# Patient Record
Sex: MALE | Race: WHITE | NOT HISPANIC OR LATINO | Employment: FULL TIME | URBAN - NONMETROPOLITAN AREA
[De-identification: names, ages, dates, MRNs, and addresses within clinical notes are randomized per-mention and may not be internally consistent; named-entity substitution may affect disease eponyms.]

---

## 2023-04-01 PROBLEM — R73.9 HYPERGLYCEMIA: Status: ACTIVE | Noted: 2023-04-01

## 2023-04-01 PROBLEM — E78.6 HYPOALPHALIPOPROTEINEMIA: Status: ACTIVE | Noted: 2023-04-01

## 2023-04-01 PROBLEM — R74.8 ELEVATED LIPASE: Status: ACTIVE | Noted: 2023-04-01

## 2023-04-01 PROBLEM — F51.05 INSOMNIA SECONDARY TO DEPRESSION WITH ANXIETY: Status: ACTIVE | Noted: 2023-04-01

## 2023-04-01 PROBLEM — D75.1 POLYCYTHEMIA: Status: ACTIVE | Noted: 2023-04-01

## 2023-04-01 PROBLEM — R07.81 RIB PAIN ON LEFT SIDE: Status: ACTIVE | Noted: 2023-04-01

## 2023-04-01 PROBLEM — B07.0 PLANTAR WART OF RIGHT FOOT: Status: ACTIVE | Noted: 2023-04-01

## 2023-04-01 PROBLEM — R10.11 RIGHT UPPER QUADRANT ABDOMINAL PAIN: Status: ACTIVE | Noted: 2023-04-01

## 2023-04-01 PROBLEM — F17.200 SMOKER: Status: ACTIVE | Noted: 2023-04-01

## 2023-04-01 PROBLEM — D23.9: Status: ACTIVE | Noted: 2023-04-01

## 2023-04-01 PROBLEM — F41.8 INSOMNIA SECONDARY TO DEPRESSION WITH ANXIETY: Status: ACTIVE | Noted: 2023-04-01

## 2023-04-01 PROBLEM — L21.9 SEBORRHEIC DERMATITIS: Status: ACTIVE | Noted: 2023-04-01

## 2023-04-01 PROBLEM — R74.8 ELEVATED AMYLASE: Status: ACTIVE | Noted: 2023-04-01

## 2023-04-01 PROBLEM — D48.5 NEOPLASM OF UNCERTAIN BEHAVIOR OF SKIN: Status: ACTIVE | Noted: 2023-04-01

## 2023-04-01 PROBLEM — R10.9 ABDOMINAL PAIN: Status: ACTIVE | Noted: 2023-04-01

## 2023-04-01 PROBLEM — I10 HYPERTENSION: Status: ACTIVE | Noted: 2023-04-01

## 2023-04-01 PROBLEM — F43.23 SITUATIONAL MIXED ANXIETY AND DEPRESSIVE DISORDER: Status: ACTIVE | Noted: 2023-04-01

## 2023-04-01 PROBLEM — L82.1 SEBORRHEIC KERATOSIS: Status: ACTIVE | Noted: 2023-04-01

## 2023-04-01 PROBLEM — N52.9 ERECTILE DYSFUNCTION: Status: ACTIVE | Noted: 2023-04-01

## 2023-04-01 PROBLEM — R07.89 CHEST WALL PAIN: Status: ACTIVE | Noted: 2023-04-01

## 2023-04-01 PROBLEM — E78.1 HIGH TRIGLYCERIDES: Status: ACTIVE | Noted: 2023-04-01
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
## 2023-04-01 PROBLEM — R06.83 SNORING: Status: ACTIVE | Noted: 2023-04-01

## 2023-04-01 PROBLEM — S30.860A TICK BITE OF BACK: Status: ACTIVE | Noted: 2023-04-01

## 2023-04-01 RX ORDER — NAPROXEN 500 MG/1
500 TABLET ORAL
COMMUNITY
Start: 2022-05-02 | End: 2024-02-07 | Stop reason: ALTCHOICE

## 2023-04-01 RX ORDER — SILDENAFIL 50 MG/1
50 TABLET, FILM COATED ORAL AS NEEDED
COMMUNITY
End: 2024-02-07 | Stop reason: SDUPTHER

## 2023-04-01 RX ORDER — LISINOPRIL 10 MG/1
10 TABLET ORAL DAILY
COMMUNITY
Start: 2021-09-07 | End: 2024-02-26 | Stop reason: SDUPTHER

## 2023-04-04 ENCOUNTER — OFFICE VISIT (OUTPATIENT)
Dept: PRIMARY CARE | Facility: CLINIC | Age: 53
End: 2023-04-04
Payer: COMMERCIAL

## 2023-04-04 VITALS
BODY MASS INDEX: 29.43 KG/M2 | SYSTOLIC BLOOD PRESSURE: 128 MMHG | HEART RATE: 88 BPM | WEIGHT: 211 LBS | DIASTOLIC BLOOD PRESSURE: 78 MMHG

## 2023-04-04 DIAGNOSIS — R05.2 SUBACUTE COUGH: ICD-10-CM

## 2023-04-04 DIAGNOSIS — F41.9 ANXIETY: Primary | ICD-10-CM

## 2023-04-04 DIAGNOSIS — F17.200 SMOKER: ICD-10-CM

## 2023-04-04 PROCEDURE — 4004F PT TOBACCO SCREEN RCVD TLK: CPT | Performed by: INTERNAL MEDICINE

## 2023-04-04 PROCEDURE — 99214 OFFICE O/P EST MOD 30 MIN: CPT | Performed by: INTERNAL MEDICINE

## 2023-04-04 PROCEDURE — 3074F SYST BP LT 130 MM HG: CPT | Performed by: INTERNAL MEDICINE

## 2023-04-04 PROCEDURE — 3078F DIAST BP <80 MM HG: CPT | Performed by: INTERNAL MEDICINE

## 2023-04-04 RX ORDER — PAROXETINE HYDROCHLORIDE 20 MG/1
20 TABLET, FILM COATED ORAL EVERY MORNING
Qty: 30 TABLET | Refills: 1 | Status: SHIPPED | OUTPATIENT
Start: 2023-04-04 | End: 2023-05-01 | Stop reason: SDUPTHER

## 2023-04-04 RX ORDER — UMECLIDINIUM BROMIDE AND VILANTEROL TRIFENATATE 62.5; 25 UG/1; UG/1
1 POWDER RESPIRATORY (INHALATION) DAILY
Qty: 1 EACH | Refills: 1 | Status: SHIPPED | OUTPATIENT
Start: 2023-04-04 | End: 2024-02-14 | Stop reason: ALTCHOICE

## 2023-04-04 ASSESSMENT — ENCOUNTER SYMPTOMS
AGITATION: 1
LIGHT-HEADEDNESS: 0
ALLERGIC/IMMUNOLOGIC NEGATIVE: 1
WHEEZING: 0
HEADACHES: 0
CONFUSION: 0
FEVER: 0
ADENOPATHY: 0
DIARRHEA: 0
MUSCULOSKELETAL NEGATIVE: 1
HEMATOLOGIC/LYMPHATIC NEGATIVE: 1
CHILLS: 0
NAUSEA: 0
CONSTIPATION: 0
SHORTNESS OF BREATH: 0
ENDOCRINE NEGATIVE: 1
NUMBNESS: 0
EYE DISCHARGE: 0
COUGH: 1
NEUROLOGICAL NEGATIVE: 1
EYES NEGATIVE: 1
ABDOMINAL PAIN: 0
JOINT SWELLING: 0
BACK PAIN: 0
GASTROINTESTINAL NEGATIVE: 1
PALPITATIONS: 0
NECK STIFFNESS: 0
DYSURIA: 0
ABDOMINAL DISTENTION: 0
CONSTITUTIONAL NEGATIVE: 1
CARDIOVASCULAR NEGATIVE: 1
VOMITING: 0

## 2023-04-04 ASSESSMENT — PATIENT HEALTH QUESTIONNAIRE - PHQ9
5. POOR APPETITE OR OVEREATING: NOT AT ALL
SUM OF ALL RESPONSES TO PHQ9 QUESTIONS 1 AND 2: 6
10. IF YOU CHECKED OFF ANY PROBLEMS, HOW DIFFICULT HAVE THESE PROBLEMS MADE IT FOR YOU TO DO YOUR WORK, TAKE CARE OF THINGS AT HOME, OR GET ALONG WITH OTHER PEOPLE: SOMEWHAT DIFFICULT
7. TROUBLE CONCENTRATING ON THINGS, SUCH AS READING THE NEWSPAPER OR WATCHING TELEVISION: NOT AT ALL
9. THOUGHTS THAT YOU WOULD BE BETTER OFF DEAD, OR OF HURTING YOURSELF: NOT AT ALL
SUM OF ALL RESPONSES TO PHQ QUESTIONS 1-9: 14
8. MOVING OR SPEAKING SO SLOWLY THAT OTHER PEOPLE COULD HAVE NOTICED. OR THE OPPOSITE, BEING SO FIGETY OR RESTLESS THAT YOU HAVE BEEN MOVING AROUND A LOT MORE THAN USUAL: NOT AT ALL
3. TROUBLE FALLING OR STAYING ASLEEP OR SLEEPING TOO MUCH: NEARLY EVERY DAY
1. LITTLE INTEREST OR PLEASURE IN DOING THINGS: NEARLY EVERY DAY
4. FEELING TIRED OR HAVING LITTLE ENERGY: NEARLY EVERY DAY
6. FEELING BAD ABOUT YOURSELF - OR THAT YOU ARE A FAILURE OR HAVE LET YOURSELF OR YOUR FAMILY DOWN: MORE THAN HALF THE DAYS
2. FEELING DOWN, DEPRESSED OR HOPELESS: NEARLY EVERY DAY

## 2023-04-04 NOTE — PROGRESS NOTES
Subjective   Patient ID: Martin Hewitt is a 53 y.o. male who presents for Anxiety.  PT C/O  COUGH NASAL CONGESTION, STILL SMOKES MORE THAN 1/2 PPD  FOR 1.5 MO  SEVERITY: MODERATE  CHARACTERISTIC: SUBACUTE  EXACERBATION FACTOR: NONE  RELIEVING FACTOR: NONE  ASSOCIATED SYMPTOMS: NO SOB NO FEVER NO CHILLS, ANXIETY  PRIOR TX: NONE        Anxiety  Patient reports no chest pain, confusion, nausea, palpitations or shortness of breath.           Review of Systems   Constitutional: Negative.  Negative for chills and fever.   HENT: Negative.  Negative for congestion.    Eyes: Negative.  Negative for discharge.   Respiratory:  Positive for cough. Negative for shortness of breath and wheezing.    Cardiovascular: Negative.  Negative for chest pain, palpitations and leg swelling.   Gastrointestinal: Negative.  Negative for abdominal distention, abdominal pain, constipation, diarrhea, nausea and vomiting.   Endocrine: Negative.    Genitourinary: Negative.  Negative for dysuria and urgency.   Musculoskeletal: Negative.  Negative for back pain, joint swelling and neck stiffness.   Skin: Negative.  Negative for rash.   Allergic/Immunologic: Negative.  Negative for immunocompromised state.   Neurological: Negative.  Negative for light-headedness, numbness and headaches.   Hematological: Negative.  Negative for adenopathy.   Psychiatric/Behavioral:  Positive for agitation. Negative for behavioral problems and confusion.    All other systems reviewed and are negative.      Objective   Physical Exam  Vitals reviewed.   Constitutional:       General: He is not in acute distress.     Appearance: Normal appearance.   HENT:      Head: Normocephalic and atraumatic.      Nose: Nose normal.   Eyes:      Conjunctiva/sclera: Conjunctivae normal.      Pupils: Pupils are equal, round, and reactive to light.   Neck:      Vascular: No carotid bruit.   Cardiovascular:      Rate and Rhythm: Normal rate and regular rhythm.      Pulses: Normal pulses.       Heart sounds:      No gallop.   Pulmonary:      Effort: Pulmonary effort is normal. No respiratory distress.      Breath sounds: No wheezing.      Comments: DIMINISHED BS  Abdominal:      General: Bowel sounds are normal.      Palpations: Abdomen is soft.      Tenderness: There is no abdominal tenderness.   Musculoskeletal:         General: Normal range of motion.      Cervical back: Normal range of motion. No rigidity.   Lymphadenopathy:      Cervical: No cervical adenopathy.   Skin:     General: Skin is warm.      Findings: No rash.   Neurological:      General: No focal deficit present.      Mental Status: He is alert and oriented to person, place, and time.   Psychiatric:         Mood and Affect: Mood normal.         Behavior: Behavior normal.       /78 (BP Location: Right arm, Patient Position: Sitting)   Pulse 88   Wt 95.7 kg (211 lb)   BMI 29.43 kg/m²    No results found for: CBCDIF, CMPLAS, PSA, LASA, HGBA1C  Assessment/Plan   Problem List Items Addressed This Visit          Other    Smoker    Relevant Orders    XR chest 2 views    Pulmonary function testing    Anxiety - Primary    Relevant Medications    PARoxetine (Paxil) 20 mg tablet     Other Visit Diagnoses       Subacute cough        Relevant Medications    umeclidinium-vilanteroL (Anoro Ellipta) 62.5-25 mcg/actuation blister with device    Other Relevant Orders    XR chest 2 views    Pulmonary function testing         Labs reviewed with pt        MDM    1) COMPLEXITY: 1 UNDIAGNOSED NEW PROBLEM WITH UNCERTAIN PROGNOSIS  2)DATA: TESTS INTERPRETED AND OR ORDERED, TOOK INDEPENDENT HISTORY OR RECORDS REVIEWED  3)RISK: MODERATE RISK DUE TO NATURE OF MEDICAL CONDITIONS/COMORBIDITY OR MEDICATIONS ORDERED OR SURGICAL OR PROCEDURE REFERRAL, .    Addendum: abnormal CXR  r/o malignancy in the lung to get CT

## 2023-04-07 ENCOUNTER — TELEPHONE (OUTPATIENT)
Dept: PRIMARY CARE | Facility: CLINIC | Age: 53
End: 2023-04-07
Payer: COMMERCIAL

## 2023-04-07 NOTE — TELEPHONE ENCOUNTER
PT CALLED IN [facility] IS NOT COVERED BY INSURANCE AND WANTS IT CHANGED TO [facility].    ALSO HE SPOKE WITH HIS INSURANCE AND THE PFT WILL BE COVERED BUT INSURANCE REQUIRES A PA FOR THE XR AND WE DO NOT DO THEM. PLEASE ADVISE

## 2023-04-11 NOTE — TELEPHONE ENCOUNTER
I DID PRE AUTH LOOK UP IN BOTH [facility] AND CALLED AND SPOKE WITH BOTH PRE AUTH AND BENEFIT DEPARTMENTS NO PA IS NEEDED FOR A C XRAY AND IS COVERED BENEFIT.  PATIENT MADE AWARE AND IS GOOD.    THE INHALER SENT IN FOR HIM TO RA .00 HE CANNOT AFFORD THAT THE INSURANCE TOLD HIM THAT [facility] INHALER IS ONLY 115.00 CAN WE CALL THIS IN PLEASE.

## 2023-04-19 DIAGNOSIS — J18.9 PNEUMONIA DUE TO INFECTIOUS ORGANISM, UNSPECIFIED LATERALITY, UNSPECIFIED PART OF LUNG: Primary | ICD-10-CM

## 2023-04-19 RX ORDER — LEVOFLOXACIN 500 MG/1
500 TABLET, FILM COATED ORAL DAILY
Qty: 7 TABLET | Refills: 0 | Status: SHIPPED | OUTPATIENT
Start: 2023-04-19 | End: 2023-05-01 | Stop reason: ALTCHOICE

## 2023-05-01 ENCOUNTER — TELEPHONE (OUTPATIENT)
Dept: PRIMARY CARE | Facility: CLINIC | Age: 53
End: 2023-05-01

## 2023-05-01 ENCOUNTER — OFFICE VISIT (OUTPATIENT)
Dept: PRIMARY CARE | Facility: CLINIC | Age: 53
End: 2023-05-01
Payer: COMMERCIAL

## 2023-05-01 VITALS
SYSTOLIC BLOOD PRESSURE: 138 MMHG | HEART RATE: 64 BPM | BODY MASS INDEX: 29.4 KG/M2 | WEIGHT: 210 LBS | DIASTOLIC BLOOD PRESSURE: 78 MMHG | HEIGHT: 71 IN

## 2023-05-01 DIAGNOSIS — F17.200 SMOKER: ICD-10-CM

## 2023-05-01 DIAGNOSIS — R93.89 ABNORMAL CXR: Primary | ICD-10-CM

## 2023-05-01 DIAGNOSIS — F41.9 ANXIETY: ICD-10-CM

## 2023-05-01 PROCEDURE — 3078F DIAST BP <80 MM HG: CPT | Performed by: INTERNAL MEDICINE

## 2023-05-01 PROCEDURE — 99214 OFFICE O/P EST MOD 30 MIN: CPT | Performed by: INTERNAL MEDICINE

## 2023-05-01 PROCEDURE — 4004F PT TOBACCO SCREEN RCVD TLK: CPT | Performed by: INTERNAL MEDICINE

## 2023-05-01 PROCEDURE — 3075F SYST BP GE 130 - 139MM HG: CPT | Performed by: INTERNAL MEDICINE

## 2023-05-01 RX ORDER — LEVOFLOXACIN 500 MG/1
500 TABLET, FILM COATED ORAL DAILY
Qty: 7 TABLET | Refills: 0 | Status: SHIPPED | OUTPATIENT
Start: 2023-05-01 | End: 2023-05-08

## 2023-05-01 RX ORDER — PAROXETINE HYDROCHLORIDE 20 MG/1
20 TABLET, FILM COATED ORAL EVERY MORNING
Qty: 30 TABLET | Refills: 11 | Status: SHIPPED | OUTPATIENT
Start: 2023-05-01 | End: 2024-02-07 | Stop reason: SDUPTHER

## 2023-05-01 ASSESSMENT — ENCOUNTER SYMPTOMS
NUMBNESS: 0
SHORTNESS OF BREATH: 0
CONFUSION: 0
DYSURIA: 0
FEVER: 0
NAUSEA: 0
DIARRHEA: 0
PSYCHIATRIC NEGATIVE: 1
BACK PAIN: 0
WHEEZING: 0
ADENOPATHY: 0
HEMATOLOGIC/LYMPHATIC NEGATIVE: 1
VOMITING: 0
HEADACHES: 0
EYE DISCHARGE: 0
ENDOCRINE NEGATIVE: 1
LIGHT-HEADEDNESS: 0
CARDIOVASCULAR NEGATIVE: 1
MUSCULOSKELETAL NEGATIVE: 1
CHILLS: 0
AGITATION: 0
COUGH: 0
NECK STIFFNESS: 0
ABDOMINAL PAIN: 0
ALLERGIC/IMMUNOLOGIC NEGATIVE: 1
GASTROINTESTINAL NEGATIVE: 1
RESPIRATORY NEGATIVE: 1
CONSTITUTIONAL NEGATIVE: 1
NEUROLOGICAL NEGATIVE: 1
CONSTIPATION: 0
JOINT SWELLING: 0
EYES NEGATIVE: 1
PALPITATIONS: 0
ABDOMINAL DISTENTION: 0

## 2023-05-01 NOTE — PROGRESS NOTES
Subjective   Patient ID: Martin Hewitt is a 53 y.o. male who presents for Follow-up (3 WEEK F/U CXR + FENO AND MED CHECK ANXIETY. PATIENT WAS UNABLE TO COMPLETE PFT. PATIENT STATES [facility] COST WAS OVER $300 SO HE DIDN'T FILL THE PRESCRIPTION. ).  HERE FOR FU FEELS BETTER  NO MORE COUGH  SMOKE 1 PPD  COULD NOT DO PFT        Review of Systems   Constitutional: Negative.  Negative for chills and fever.   HENT: Negative.  Negative for congestion.    Eyes: Negative.  Negative for discharge.   Respiratory: Negative.  Negative for cough, shortness of breath and wheezing.    Cardiovascular: Negative.  Negative for chest pain, palpitations and leg swelling.   Gastrointestinal: Negative.  Negative for abdominal distention, abdominal pain, constipation, diarrhea, nausea and vomiting.   Endocrine: Negative.    Genitourinary: Negative.  Negative for dysuria and urgency.   Musculoskeletal: Negative.  Negative for back pain, joint swelling and neck stiffness.   Skin: Negative.  Negative for rash.   Allergic/Immunologic: Negative.  Negative for immunocompromised state.   Neurological: Negative.  Negative for light-headedness, numbness and headaches.   Hematological: Negative.  Negative for adenopathy.   Psychiatric/Behavioral: Negative.  Negative for agitation, behavioral problems and confusion.    All other systems reviewed and are negative.      Objective   Physical Exam  Vitals reviewed.   Constitutional:       General: He is not in acute distress.     Appearance: Normal appearance.   HENT:      Head: Normocephalic and atraumatic.      Nose: Nose normal.   Eyes:      Conjunctiva/sclera: Conjunctivae normal.      Pupils: Pupils are equal, round, and reactive to light.   Neck:      Vascular: No carotid bruit.   Cardiovascular:      Rate and Rhythm: Normal rate and regular rhythm.      Pulses: Normal pulses.      Heart sounds:      No gallop.   Pulmonary:      Effort: Pulmonary effort is normal. No respiratory distress.      Breath  "sounds: Normal breath sounds. No wheezing.   Abdominal:      General: Bowel sounds are normal.      Palpations: Abdomen is soft.      Tenderness: There is no abdominal tenderness.   Musculoskeletal:         General: Normal range of motion.      Cervical back: Normal range of motion. No rigidity.   Lymphadenopathy:      Cervical: No cervical adenopathy.   Skin:     General: Skin is warm.      Findings: No rash.   Neurological:      General: No focal deficit present.      Mental Status: He is alert and oriented to person, place, and time.   Psychiatric:         Mood and Affect: Mood normal.         Behavior: Behavior normal.       /78   Pulse 64   Ht 1.803 m (5' 11\")   Wt 95.3 kg (210 lb)   BMI 29.29 kg/m²    No results found for: CBCDIF, CMPLAS, PSA, LASA, HGBA1C  Assessment/Plan   Problem List Items Addressed This Visit          Other    Smoker    Anxiety    Relevant Medications    PARoxetine (Paxil) 20 mg tablet     Other Visit Diagnoses       Abnormal CXR    -  Primary    Relevant Medications    levoFLOXacin (Levaquin) 500 mg tablet    Other Relevant Orders    CT chest w IV contrast    Creatinine, Serum           \  FENO 6    CXR REVIEWED    I have counselled patient about need for smoking/tobacco cessation and how I can support efforts when patient is ready to quit.  Discussed nicotine replacement therapy, Varenicline, Bupropion, hypnosis, support groups, and acupuncture as potential options.  I spend 4 minutes counseling.        MDM    1) COMPLEXITY: 1 OR MORE CHRONIC CONDITION WITH EXACERBATION, OR PROGRESSION OR SIDE EFFECT OF TREATMENT ADDRESSED  2)DATA: TESTS INTERPRETED AND OR ORDERED, TOOK INDEPENDENT HISTORY OR RECORDS REVIEWED  3)RISK: MODERATE RISK DUE TO NATURE OF MEDICAL CONDITIONS/COMORBIDITY OR MEDICATIONS ORDERED OR SURGICAL OR PROCEDURE REFERRAL, .      TO TAKE A COURSE OF ABX THEN YO HACVE CT AFTER 2 WEEKS THEN REEVAL    FU 4 WEEKS  "

## 2023-05-04 DIAGNOSIS — R93.89 ABNORMAL CXR: Primary | ICD-10-CM

## 2023-05-04 NOTE — TELEPHONE ENCOUNTER
PATIENT WANTS TO FIND OUT HOW MUCH THIS WILL BE OUT OF POCKET PRIOR TO GOING WITH REFERRAL; I HAVE A MESSAGE INTO [phone] THEY ARE TO CALL ME WITH INFORMATION.  IF UNDER 800.00 WILL MOVE FORWARD IF OVER GO WITH PULMONOLGIST.  CT IS SCHEDULE PER [place] FOR 5/23/23 @ 2:30PM

## 2023-05-05 NOTE — TELEPHONE ENCOUNTER
SPOKE WITH DEPARTMENT AND RECEIVED ANOTHER NUMBER [phone] LEFT MESSAGE WITH THEM TO RETURN CALL FOR PRICING

## 2023-05-09 NOTE — TELEPHONE ENCOUNTER
QUOTE FOR CT IS $5096 AND PATIENT IS GOING TO GO WITH PULMONOLOGIST REFERRAL  SCHEDULED WITH PATIENT AND [facility] FOR MAY 17TH @ 3:20PM

## 2023-06-05 ENCOUNTER — APPOINTMENT (OUTPATIENT)
Dept: PRIMARY CARE | Facility: CLINIC | Age: 53
End: 2023-06-05
Payer: COMMERCIAL

## 2023-10-23 ENCOUNTER — APPOINTMENT (OUTPATIENT)
Dept: URBAN - METROPOLITAN AREA CLINIC 204 | Age: 53
Setting detail: DERMATOLOGY
End: 2023-10-24

## 2023-10-23 PROCEDURE — 99213 OFFICE O/P EST LOW 20 MIN: CPT

## 2023-10-23 PROCEDURE — OTHER MIPS QUALITY: OTHER

## 2023-10-23 NOTE — PROCEDURE: MIPS QUALITY
Detail Level: Detailed
Additional Notes: Documentation for MIPS  purposes only. Full Patient visit note from paper chart is available for review and also scanned in EMA chart.
Quality 226: Preventive Care And Screening: Tobacco Use: Screening And Cessation Intervention: Patient screened for tobacco use, is a smoker AND received Cessation Counseling within the Previous 12 Months
Additional Notes: Patient received verbal cessation counseling (3 min or less)
Patient tolerated procedure well.

## 2024-01-31 ENCOUNTER — LAB (OUTPATIENT)
Dept: LAB | Facility: LAB | Age: 54
End: 2024-01-31
Payer: COMMERCIAL

## 2024-01-31 DIAGNOSIS — D75.1 SECONDARY POLYCYTHEMIA: ICD-10-CM

## 2024-01-31 DIAGNOSIS — R93.89 ABNORMAL CXR: ICD-10-CM

## 2024-01-31 DIAGNOSIS — R73.9 HYPERGLYCEMIA, UNSPECIFIED: Primary | ICD-10-CM

## 2024-01-31 DIAGNOSIS — I10 ESSENTIAL (PRIMARY) HYPERTENSION: ICD-10-CM

## 2024-01-31 DIAGNOSIS — Z12.5 ENCOUNTER FOR SCREENING FOR MALIGNANT NEOPLASM OF PROSTATE: ICD-10-CM

## 2024-01-31 DIAGNOSIS — E78.1 PURE HYPERGLYCERIDEMIA: ICD-10-CM

## 2024-01-31 LAB
ALBUMIN SERPL BCP-MCNC: 4.6 G/DL (ref 3.4–5)
ALP SERPL-CCNC: 93 U/L (ref 33–120)
ALT SERPL W P-5'-P-CCNC: 30 U/L (ref 10–52)
ANION GAP SERPL CALC-SCNC: 10 MMOL/L (ref 10–20)
AST SERPL W P-5'-P-CCNC: 17 U/L (ref 9–39)
BASOPHILS # BLD AUTO: 0.09 X10*3/UL (ref 0–0.1)
BASOPHILS NFR BLD AUTO: 1.1 %
BILIRUB SERPL-MCNC: 0.6 MG/DL (ref 0–1.2)
BUN SERPL-MCNC: 10 MG/DL (ref 6–23)
CALCIUM SERPL-MCNC: 9.3 MG/DL (ref 8.6–10.3)
CHLORIDE SERPL-SCNC: 108 MMOL/L (ref 98–107)
CHOLEST SERPL-MCNC: 169 MG/DL (ref 0–199)
CHOLESTEROL/HDL RATIO: 7
CO2 SERPL-SCNC: 27 MMOL/L (ref 21–32)
CREAT SERPL-MCNC: 1.05 MG/DL (ref 0.5–1.3)
EGFRCR SERPLBLD CKD-EPI 2021: 85 ML/MIN/1.73M*2
EOSINOPHIL # BLD AUTO: 0.29 X10*3/UL (ref 0–0.7)
EOSINOPHIL NFR BLD AUTO: 3.5 %
ERYTHROCYTE [DISTWIDTH] IN BLOOD BY AUTOMATED COUNT: 13.4 % (ref 11.5–14.5)
GLUCOSE SERPL-MCNC: 99 MG/DL (ref 74–99)
HCT VFR BLD AUTO: 55.7 % (ref 41–52)
HDLC SERPL-MCNC: 24 MG/DL
HGB BLD-MCNC: 18.7 G/DL (ref 13.5–17.5)
IMM GRANULOCYTES # BLD AUTO: 0.03 X10*3/UL (ref 0–0.7)
IMM GRANULOCYTES NFR BLD AUTO: 0.4 % (ref 0–0.9)
LDLC SERPL CALC-MCNC: 109 MG/DL
LYMPHOCYTES # BLD AUTO: 2.37 X10*3/UL (ref 1.2–4.8)
LYMPHOCYTES NFR BLD AUTO: 29 %
MCH RBC QN AUTO: 31.7 PG (ref 26–34)
MCHC RBC AUTO-ENTMCNC: 33.6 G/DL (ref 32–36)
MCV RBC AUTO: 94 FL (ref 80–100)
MONOCYTES # BLD AUTO: 0.83 X10*3/UL (ref 0.1–1)
MONOCYTES NFR BLD AUTO: 10.2 %
NEUTROPHILS # BLD AUTO: 4.56 X10*3/UL (ref 1.2–7.7)
NEUTROPHILS NFR BLD AUTO: 55.8 %
NON HDL CHOLESTEROL: 145 MG/DL (ref 0–149)
NRBC BLD-RTO: 0 /100 WBCS (ref 0–0)
PLATELET # BLD AUTO: 295 X10*3/UL (ref 150–450)
POTASSIUM SERPL-SCNC: 4.4 MMOL/L (ref 3.5–5.3)
PROT SERPL-MCNC: 7.2 G/DL (ref 6.4–8.2)
PSA SERPL-MCNC: 0.76 NG/ML
RBC # BLD AUTO: 5.9 X10*6/UL (ref 4.5–5.9)
SODIUM SERPL-SCNC: 141 MMOL/L (ref 136–145)
TRIGL SERPL-MCNC: 178 MG/DL (ref 0–149)
VLDL: 36 MG/DL (ref 0–40)
WBC # BLD AUTO: 8.2 X10*3/UL (ref 4.4–11.3)

## 2024-01-31 PROCEDURE — 85025 COMPLETE CBC W/AUTO DIFF WBC: CPT

## 2024-01-31 PROCEDURE — 80061 LIPID PANEL: CPT

## 2024-01-31 PROCEDURE — 80053 COMPREHEN METABOLIC PANEL: CPT

## 2024-01-31 PROCEDURE — 84153 ASSAY OF PSA TOTAL: CPT

## 2024-01-31 PROCEDURE — 36415 COLL VENOUS BLD VENIPUNCTURE: CPT

## 2024-02-07 ENCOUNTER — TELEPHONE (OUTPATIENT)
Dept: PRIMARY CARE | Facility: CLINIC | Age: 54
End: 2024-02-07

## 2024-02-07 ENCOUNTER — OFFICE VISIT (OUTPATIENT)
Dept: PRIMARY CARE | Facility: CLINIC | Age: 54
End: 2024-02-07
Payer: COMMERCIAL

## 2024-02-07 VITALS
HEIGHT: 71 IN | DIASTOLIC BLOOD PRESSURE: 80 MMHG | WEIGHT: 220 LBS | BODY MASS INDEX: 30.8 KG/M2 | SYSTOLIC BLOOD PRESSURE: 132 MMHG | HEART RATE: 69 BPM

## 2024-02-07 DIAGNOSIS — I10 PRIMARY HYPERTENSION: ICD-10-CM

## 2024-02-07 DIAGNOSIS — L73.9 FOLLICULITIS: ICD-10-CM

## 2024-02-07 DIAGNOSIS — Z12.5 SPECIAL SCREENING FOR MALIGNANT NEOPLASM OF PROSTATE: ICD-10-CM

## 2024-02-07 DIAGNOSIS — D75.1 POLYCYTHEMIA: ICD-10-CM

## 2024-02-07 DIAGNOSIS — Z00.00 ENCOUNTER FOR PREVENTATIVE ADULT HEALTH CARE EXAMINATION: Primary | ICD-10-CM

## 2024-02-07 DIAGNOSIS — F17.210 CIGARETTE SMOKER: ICD-10-CM

## 2024-02-07 DIAGNOSIS — N52.9 ERECTILE DYSFUNCTION, UNSPECIFIED ERECTILE DYSFUNCTION TYPE: ICD-10-CM

## 2024-02-07 DIAGNOSIS — F41.9 ANXIETY: ICD-10-CM

## 2024-02-07 PROCEDURE — 3075F SYST BP GE 130 - 139MM HG: CPT | Performed by: INTERNAL MEDICINE

## 2024-02-07 PROCEDURE — 99214 OFFICE O/P EST MOD 30 MIN: CPT | Performed by: INTERNAL MEDICINE

## 2024-02-07 PROCEDURE — 3079F DIAST BP 80-89 MM HG: CPT | Performed by: INTERNAL MEDICINE

## 2024-02-07 RX ORDER — SILDENAFIL 50 MG/1
50 TABLET, FILM COATED ORAL AS NEEDED
Qty: 10 TABLET | Refills: 11 | Status: SHIPPED | OUTPATIENT
Start: 2024-02-07 | End: 2024-02-07 | Stop reason: SDUPTHER

## 2024-02-07 RX ORDER — DOXYCYCLINE 100 MG/1
100 CAPSULE ORAL 2 TIMES DAILY
Qty: 20 CAPSULE | Refills: 0 | Status: SHIPPED | OUTPATIENT
Start: 2024-02-07 | End: 2024-02-17

## 2024-02-07 RX ORDER — PAROXETINE HYDROCHLORIDE 20 MG/1
20 TABLET, FILM COATED ORAL EVERY MORNING
Qty: 30 TABLET | Refills: 11 | Status: SHIPPED | OUTPATIENT
Start: 2024-02-07 | End: 2025-02-06

## 2024-02-07 RX ORDER — SILDENAFIL 50 MG/1
50 TABLET, FILM COATED ORAL AS NEEDED
Qty: 30 TABLET | Refills: 3 | Status: SHIPPED | OUTPATIENT
Start: 2024-02-07

## 2024-02-07 ASSESSMENT — ENCOUNTER SYMPTOMS
DYSURIA: 0
PSYCHIATRIC NEGATIVE: 1
VOMITING: 0
GASTROINTESTINAL NEGATIVE: 1
ENDOCRINE NEGATIVE: 1
MUSCULOSKELETAL NEGATIVE: 1
ALLERGIC/IMMUNOLOGIC NEGATIVE: 1
HEMATOLOGIC/LYMPHATIC NEGATIVE: 1
ABDOMINAL DISTENTION: 0
NEUROLOGICAL NEGATIVE: 1
SHORTNESS OF BREATH: 0
HEADACHES: 0
ADENOPATHY: 0
LIGHT-HEADEDNESS: 0
COUGH: 0
EYE DISCHARGE: 0
CONFUSION: 0
CARDIOVASCULAR NEGATIVE: 1
EYES NEGATIVE: 1
NECK STIFFNESS: 0
WHEEZING: 0
CONSTITUTIONAL NEGATIVE: 1
ABDOMINAL PAIN: 0
PALPITATIONS: 0
CHILLS: 0
JOINT SWELLING: 0
BACK PAIN: 0
FEVER: 0
RESPIRATORY NEGATIVE: 1
CONSTIPATION: 0
AGITATION: 0
NAUSEA: 0
DIARRHEA: 0
NUMBNESS: 0

## 2024-02-07 ASSESSMENT — PATIENT HEALTH QUESTIONNAIRE - PHQ9
1. LITTLE INTEREST OR PLEASURE IN DOING THINGS: NOT AT ALL
2. FEELING DOWN, DEPRESSED OR HOPELESS: NOT AT ALL
SUM OF ALL RESPONSES TO PHQ9 QUESTIONS 1 AND 2: 0

## 2024-02-07 NOTE — PROGRESS NOTES
Subjective   Patient ID: Martin Hewitt is a 53 y.o. male who presents for Follow-up (1 year fu lab).  Here for fu with labs feels fine        Review of Systems   Constitutional: Negative.  Negative for chills and fever.   HENT: Negative.  Negative for congestion.    Eyes: Negative.  Negative for discharge.   Respiratory: Negative.  Negative for cough, shortness of breath and wheezing.    Cardiovascular: Negative.  Negative for chest pain, palpitations and leg swelling.   Gastrointestinal: Negative.  Negative for abdominal distention, abdominal pain, constipation, diarrhea, nausea and vomiting.   Endocrine: Negative.    Genitourinary: Negative.  Negative for dysuria and urgency.   Musculoskeletal: Negative.  Negative for back pain, joint swelling and neck stiffness.   Skin: Negative.  Negative for rash.   Allergic/Immunologic: Negative.  Negative for immunocompromised state.   Neurological: Negative.  Negative for light-headedness, numbness and headaches.   Hematological: Negative.  Negative for adenopathy.   Psychiatric/Behavioral: Negative.  Negative for agitation, behavioral problems and confusion.    All other systems reviewed and are negative.      Objective   Physical Exam  Vitals reviewed.   Constitutional:       General: He is not in acute distress.     Appearance: Normal appearance.   HENT:      Head: Normocephalic and atraumatic.      Nose: Nose normal.   Eyes:      Conjunctiva/sclera: Conjunctivae normal.      Pupils: Pupils are equal, round, and reactive to light.   Neck:      Vascular: No carotid bruit.   Cardiovascular:      Rate and Rhythm: Normal rate and regular rhythm.      Pulses: Normal pulses.      Heart sounds:      No gallop.   Pulmonary:      Effort: Pulmonary effort is normal. No respiratory distress.      Breath sounds: Normal breath sounds. No wheezing.   Abdominal:      General: Bowel sounds are normal.      Palpations: Abdomen is soft.      Tenderness: There is no abdominal tenderness.  "  Musculoskeletal:         General: Normal range of motion.      Cervical back: Normal range of motion. No rigidity.   Lymphadenopathy:      Cervical: No cervical adenopathy.   Skin:     General: Skin is warm.      Findings: Rash (small folliculitis area right side on the face) present.   Neurological:      General: No focal deficit present.      Mental Status: He is alert and oriented to person, place, and time.   Psychiatric:         Mood and Affect: Mood normal.         Behavior: Behavior normal.       /80 (BP Location: Right arm, Patient Position: Sitting)   Pulse 69   Ht 1.803 m (5' 11\")   Wt 99.8 kg (220 lb)   BMI 30.68 kg/m²    No results found for: \"CBCDIF\", \"CMPLAS\", \"PSA\", \"LASA\", \"HGBA1C\"  Assessment/Plan   Problem List Items Addressed This Visit       Erectile dysfunction    Relevant Medications    sildenafil (Viagra) 50 mg tablet    Other Relevant Orders    CBC and Auto Differential    Comprehensive Metabolic Panel    Hypertension    Relevant Orders    CBC and Auto Differential    Comprehensive Metabolic Panel    Polycythemia    Relevant Orders    CBC and Auto Differential    Comprehensive Metabolic Panel    Referral to Benign Hematology    Anxiety    Relevant Medications    PARoxetine (Paxil) 20 mg tablet    Other Relevant Orders    CBC and Auto Differential    Comprehensive Metabolic Panel     Other Visit Diagnoses       Encounter for preventative adult health care examination    -  Primary    Relevant Orders    CBC and Auto Differential    Comprehensive Metabolic Panel    Lipid Panel    Cigarette smoker        Relevant Orders    CT lung screening low dose    CBC and Auto Differential    Comprehensive Metabolic Panel    Folliculitis        Relevant Medications    doxycycline (Vibramycin) 100 mg capsule    Other Relevant Orders    CBC and Auto Differential    Comprehensive Metabolic Panel    Special screening for malignant neoplasm of prostate        Relevant Orders    Prostate Specific " Antigen, Screen             MONITOR BP   GOAL BP LOWER THAN 130/80  LOW SALT  EXERCISE DAILY    I have counselled patient about need for smoking/tobacco cessation and how I can support efforts when patient is ready to quit.  Discussed nicotine replacement therapy, Varenicline, Bupropion, hypnosis, support groups, and acupuncture as potential options.  I spend 4 minutes counseling.    Pt smokes since 21, I discussed screening ca lung , risk ans benefits pros and cons , he is interested    If folliculitis does not get better see derm or let me know( has a dermatologist)    Fu 1 year bw

## 2024-02-14 ENCOUNTER — OFFICE VISIT (OUTPATIENT)
Dept: HEMATOLOGY/ONCOLOGY | Facility: CLINIC | Age: 54
End: 2024-02-14
Payer: COMMERCIAL

## 2024-02-14 VITALS
SYSTOLIC BLOOD PRESSURE: 133 MMHG | WEIGHT: 217.6 LBS | TEMPERATURE: 97.2 F | HEIGHT: 72 IN | RESPIRATION RATE: 20 BRPM | BODY MASS INDEX: 29.47 KG/M2 | DIASTOLIC BLOOD PRESSURE: 83 MMHG | OXYGEN SATURATION: 96 % | HEART RATE: 78 BPM

## 2024-02-14 DIAGNOSIS — D75.1 POLYCYTHEMIA: ICD-10-CM

## 2024-02-14 LAB
FERRITIN SERPL-MCNC: 310 NG/ML (ref 20–300)
IRON SATN MFR SERPL: 20 % (ref 25–45)
IRON SERPL-MCNC: 75 UG/DL (ref 35–150)
TIBC SERPL-MCNC: 372 UG/DL (ref 240–445)
UIBC SERPL-MCNC: 297 UG/DL (ref 110–370)

## 2024-02-14 PROCEDURE — 82728 ASSAY OF FERRITIN: CPT | Performed by: INTERNAL MEDICINE

## 2024-02-14 PROCEDURE — 99215 OFFICE O/P EST HI 40 MIN: CPT | Performed by: INTERNAL MEDICINE

## 2024-02-14 PROCEDURE — 3079F DIAST BP 80-89 MM HG: CPT | Performed by: INTERNAL MEDICINE

## 2024-02-14 PROCEDURE — G0452 MOLECULAR PATHOLOGY INTERPR: HCPCS | Performed by: INTERNAL MEDICINE

## 2024-02-14 PROCEDURE — 82668 ASSAY OF ERYTHROPOIETIN: CPT | Performed by: INTERNAL MEDICINE

## 2024-02-14 PROCEDURE — 83540 ASSAY OF IRON: CPT | Performed by: INTERNAL MEDICINE

## 2024-02-14 PROCEDURE — 3075F SYST BP GE 130 - 139MM HG: CPT | Performed by: INTERNAL MEDICINE

## 2024-02-14 PROCEDURE — 81270 JAK2 GENE: CPT | Performed by: INTERNAL MEDICINE

## 2024-02-14 PROCEDURE — 99205 OFFICE O/P NEW HI 60 MIN: CPT | Performed by: INTERNAL MEDICINE

## 2024-02-14 ASSESSMENT — PAIN SCALES - GENERAL: PAINLEVEL: 0-NO PAIN

## 2024-02-14 ASSESSMENT — ENCOUNTER SYMPTOMS
SHORTNESS OF BREATH: 0
APPETITE CHANGE: 0
NAUSEA: 0
FATIGUE: 0
TROUBLE SWALLOWING: 0
ARTHRALGIAS: 1
UNEXPECTED WEIGHT CHANGE: 0
BRUISES/BLEEDS EASILY: 0
ABDOMINAL PAIN: 0
FREQUENCY: 0
RHINORRHEA: 0
CONSTIPATION: 0
WEAKNESS: 0
HEADACHES: 0
MYALGIAS: 0
HEMATURIA: 0
COUGH: 1
SLEEP DISTURBANCE: 0
DIARRHEA: 0
VOMITING: 0
LIGHT-HEADEDNESS: 0
NERVOUS/ANXIOUS: 0
NUMBNESS: 0
DYSURIA: 0

## 2024-02-14 ASSESSMENT — COLUMBIA-SUICIDE SEVERITY RATING SCALE - C-SSRS
1. IN THE PAST MONTH, HAVE YOU WISHED YOU WERE DEAD OR WISHED YOU COULD GO TO SLEEP AND NOT WAKE UP?: NO
2. HAVE YOU ACTUALLY HAD ANY THOUGHTS OF KILLING YOURSELF?: NO
6. HAVE YOU EVER DONE ANYTHING, STARTED TO DO ANYTHING, OR PREPARED TO DO ANYTHING TO END YOUR LIFE?: NO

## 2024-02-14 ASSESSMENT — PATIENT HEALTH QUESTIONNAIRE - PHQ9
SUM OF ALL RESPONSES TO PHQ9 QUESTIONS 1 AND 2: 0
1. LITTLE INTEREST OR PLEASURE IN DOING THINGS: NOT AT ALL
2. FEELING DOWN, DEPRESSED OR HOPELESS: NOT AT ALL

## 2024-02-14 NOTE — PROGRESS NOTES
Patient ID:Martin Hewitt is a 53 y.o. year old male patient with erythrocytosis    Referring Physician: Shlomo Pruitt MD  [address] [place] [address]  Primary Care Provider: Shlomo Pruitt MD    Chief Complaint  Chief Complaint   Patient presents with    Polycythemia   He is referred as a new patient for erythrocytosis.        History of the Present Illness    He is a 53-year-old  male who smokes about a pack of cigarettes a day and has done so at least 30 years.    11/4/2019.  He was found to have polycythemia by Dr. Pruitt.  CBC showed a white count of 6.9 with hemoglobin of 17.6 and hematocrit of 51.3.  MCV MCH and MCHC were all normal.  Platelet count was 264,000 and white blood cell differential count showed 47% polys, 37 lymphs, 8 monos, 3 eosinophils and 2 basophils.  His absolute basophilia was 0.2 with the upper limits of normal being 0.1.    11/2/2020.  CBC showed a white count of 9.2 with a hemoglobin 17.4 hematocrit 51.6 and a platelet count of 265,000.  White blood cell differential count showed 44% polys, 42 lymphs, 8 monos, 3 eosinophils and 1 basophil.  His basophilia was 0.1%.    10/1/2021.  His polycythemia has been persistent and progressive.  CBC showed white count 9.5 with hemoglobin 17.8, hematocrit 53.4 and platelet count of 283,000.  White blood cell differential count was normal.  There was a minimal basophilia at 0.3 with the upper limits of normal being 0.1.  The patient says that he has been under a lot of stress at work.  He has never had a stroke or heart attack nor has he had symptoms of a TIA.  He does have a family history of coronary artery disease in his grandfather who had bypass surgery.  Dr. Pruitt ordered an erythropoietin level.  Had colonoscopy which showed only hyperplastic polyps.  PSA was 0.65.  Serum chemistries were normal.  TSH was normal.    2/2/2022.  CBC showed white count 9.9 with hemoglobin 17.9 hematocrit 53.3 and a  platelet count of 305,000.  White blood cell differential count showed 44% polys, 42% lymphs, 10 monos, 2 eosinophils and 0.7% basophils with an absolute basophilia of 0.1% which was normal.  Seen by Dr. Pruitt who recommended the patient to stop smoking.  The patient declined to have a sleep study.  He was encouraged to exercise daily.  His erythropoietin level was 9.8 which is inappropriately high but still within the normal range.    2/2/2023.  CBC showed white count 10.3 with hemoglobin 17.7, hematocrit 53.6 and a platelet count of 306,000.  Again Dr. Pruitt recommended smoking cessation.    4/18/2023.  Chest x-ray showed a subtle right infrahilar opacity with  no pleural effusion or no pneumothorax..  There is no evidence of bony abnormality.    5/1/2023.  Dr. Pruitt had ordered pulmonary function test but the patient was not able to have them performed.    6/7/2023.  Seen by Dr. Felder for abnormal chest x-ray and cough.  He felt the patient has simple chronic bronchitis and recommended that he quit smoking and have his chest x-ray repeated in about 6 weeks.  Repeat chest x-ray showed that the previously described possible infrahilar airspace disease was not appreciated.    7/19/2023.  Chest x-ray showed no evidence of consolidation effusion or pneumothorax.  There was stable mild hilar prominence.    1/31/2024.  CBC showed white count of 8.2 with a hemoglobin of 18.7 hematocrit 55.7 and a platelet count of 295,000.  White blood cell differential count showed 55% polys, 29 lymphs, 10 monos, 3 eosinophils and 1 basophil.  The absolute basophilia was 0.09%.    2/7/2024.  Seen in follow-up by Dr. Pruitt.  With persistent polycythemia he was referred to hematology.  Arrangements were made for him to have a CAT scan of the chest for lung cancer screening purposes.      Interval Note    2/14/2024.  He is here today as a new patient to discuss his polycythemia.  We went over the most common causes of  polycythemia as listed in Up-To-Date.   Other than smoking, he does not have a history of other problems that may predispose to polycythemia.  He has been requested to do a sleep study in the past which he has declined.  His pulse oximetry today on room air was 96%.  He does not have symptoms of cerebellar hemangioma.  He does have kidney cysts seen on ultrasound in 2021.  He has no history of cardiovascular disease other than hypertension.  He has no other pulmonary problems.  He tried and failed at doing pulmonary function testing.  He is not on testosterone  Over the most common causes of polycythemia as listed in Up-To-Date.  We will go ahead with JAK2 and repeat erythropoietin testing.    He was strongly encouraged to quit smoking.  He says that he has no desire to do so at this time.  We talked about the complex changes that need to be made with smoking cessation not only with nicotine replacement but also with the environmental and mechanical habits that lead to smoking and what to do instead of following old habits.  We talked about the multiple disease processes that are impacted by cigarette smoking.  He knows that we are willing to help him quit smoking and discussed that it usually takes multiple attempts to be successful at this.  We have furnished the 1 800 I QUIT number for him.    We also discussed phlebotomy.  This is usually not done in patients with secondary polycythemia who are asymptomatic.  He knows that he could be a frequent blood donator if he has no mutations found.  We encouraged him to exercise and push fluids.  We talked about stress reduction strategies as well.  We will call him with results of this testing after it has been completed.      Comorbidities  Anxiety and depression  Continued smoking  Hyperlipidemia    Monitoring Parameters  Physical examinations, CBCs, pulse oximetry, chest x-rays with CT scans of the chest due to high risk for lung cancer with his greater than  30-pack-year history so far.    Review of Systems - Oncology   Review of Systems   Constitutional:  Negative for appetite change, fatigue and unexpected weight change.   HENT:  Negative for dental problem, nosebleeds, rhinorrhea and trouble swallowing.    Eyes:  Negative for visual disturbance.   Respiratory:  Positive for cough (occasionally, smokes 1 pack a day.). Negative for shortness of breath.    Cardiovascular:  Negative for leg swelling.   Gastrointestinal:  Negative for abdominal pain, constipation, diarrhea, nausea and vomiting.   Endocrine: Negative for polyuria.   Genitourinary:  Negative for dysuria, frequency, hematuria and urgency.   Musculoskeletal:  Positive for arthralgias (generalized). Negative for myalgias.   Skin:  Negative for pallor.   Neurological:  Negative for weakness, light-headedness, numbness and headaches.   Hematological:  Does not bruise/bleed easily.   Psychiatric/Behavioral:  Negative for self-injury, sleep disturbance and suicidal ideas. The patient is not nervous/anxious.         Past Medical History  Past Medical History:   Diagnosis Date    Immunization not carried out because of patient refusal     Influenza vaccination declined    Other specified health status     No pertinent past medical history        Surgical History  Past Surgical History:   Procedure Laterality Date    COLONOSCOPY  09/23/2021    REPEAT 3 TO 5 YEARS. A.  COLON, RANDOM BIOPSIES: --COLONIC MUCOSA, NO SIGNIFICANT HISTOPATHOLOGICAL ABNORMALITIES.  B.  TRANSVERSE COLON, POLYPECTOMY: --HYPERPLASTIC POLYP.  C.  RECTUM, POLYPECTOMY: --HYPERPLASTIC POLYP.       Social History  Social History     Tobacco Use    Smoking status: Every Day     Types: Cigarettes     Passive exposure: Current    Smokeless tobacco: Never   Vaping Use    Vaping Use: Never used   Substance Use Topics    Alcohol use: Not Currently    Drug use: Never        Family History  family history includes Heart attack in an other family member;  "Hypertension in his mother; Lung cancer in an other family member; No Known Problems in his father.        Current Medications  Current Outpatient Medications   Medication Instructions    doxycycline (VIBRAMYCIN) 100 mg, oral, 2 times daily, Take with at least 8 ounces (large glass) of water, do not lie down for 30 minutes after    lisinopril 10 mg, oral, Daily    PARoxetine (PAXIL) 20 mg, oral, Every morning    sildenafil (VIAGRA) 50 mg, oral, As needed           OARRS Review  I have personally reviewed the OARRS report for Martin Hewitt. I have considered the risks of abuse, dependence, addiction and diversion.  Review of this document shows that he has no prescriptions for sedatives stimulants or opioids.    Vital Signs  /83 (BP Location: Left arm, Patient Position: Sitting, BP Cuff Size: Large adult)   Pulse 78   Temp 36.2 °C (97.2 °F)   Resp 20   Ht (S) 1.826 m (5' 11.89\")   Wt 98.7 kg (217 lb 9.6 oz)   SpO2 96%   BMI 29.60 kg/m²      Physical Exam  Vitals and nursing note reviewed. Exam conducted with a chaperone present.   Constitutional:       General: He is not in acute distress.     Appearance: Normal appearance. He is not ill-appearing or toxic-appearing.      Comments: He is a well-developed well-nourished  male who does not appear to be acutely or chronically ill.  He does not have a cough, has no increased work of breathing and is somewhat anxious   HENT:      Head: Normocephalic and atraumatic.      Nose: Nose normal.      Mouth/Throat:      Mouth: Mucous membranes are moist.      Pharynx: Oropharynx is clear. No oropharyngeal exudate or posterior oropharyngeal erythema.      Comments: Good dentition  Eyes:      General: No scleral icterus.     Extraocular Movements: Extraocular movements intact.      Conjunctiva/sclera: Conjunctivae normal.      Pupils: Pupils are equal, round, and reactive to light.   Neck:      Comments: There is no adenopathy about the head neck region, " supraclavicular, axillary or inguinal regions bilaterally.  Cardiovascular:      Rate and Rhythm: Normal rate and regular rhythm.      Heart sounds: No murmur heard.  Pulmonary:      Effort: Pulmonary effort is normal. No respiratory distress.      Breath sounds: Normal breath sounds. No wheezing, rhonchi or rales.   Abdominal:      General: There is no distension.      Palpations: Abdomen is soft. There is no mass.      Tenderness: There is no abdominal tenderness. There is no guarding or rebound.   Musculoskeletal:         General: Normal range of motion.      Cervical back: Normal range of motion and neck supple. No rigidity or tenderness.      Right lower leg: No edema.      Left lower leg: No edema.   Lymphadenopathy:      Cervical: No cervical adenopathy.   Skin:     General: Skin is warm and dry.      Coloration: Skin is not jaundiced or pale.      Findings: No rash.      Comments: Normal coloration   Neurological:      General: No focal deficit present.      Mental Status: He is alert and oriented to person, place, and time. Mental status is at baseline.      Cranial Nerves: No cranial nerve deficit.      Motor: No weakness.      Gait: Gait normal.   Psychiatric:         Mood and Affect: Mood normal.         Behavior: Behavior normal.         Thought Content: Thought content normal.         Judgment: Judgment normal.        Current Laboratory Data  Laboratory testing done today shows that his serum iron is 75.  His percent saturation is 20%.  His ferritinIs elevated at 310 with the upper limits of normal being 300.      Recent Imaging  No results found.      Pathology  Erythrocytosis      Performance Status:  Asymptomatic    Assessment/Plan      1.  Erythrocytosis.  This is persistent and progressive.  We have strongly encouraged him to quit smoking.  We have ordered erythropoietin level and a Lorenzo 2 mutation test.  We could encourage him to have a sleep study and to perform pulmonary function tests that had  been previously requested.  We have encouraged him to stay hydrated and exercise frequently.  He knows that he needs to decrease his stress level.  He has an upcoming low-dose CAT scan to look for lung cancer.  We will call him with the results of these tests after they have been completed.    2.  Continued smoking.  We had a long discussion concerning smoking cessation.  He knows that it frequently takes many tries to be successful in smoking cessation.  He knows that there is free help to be had.  He is not interested at this time.    3.  Comorbidities.  These include but are not limited to:  Anxiety and depression  Continued smoking  Hyperlipidemia.  He will continue on his current medications and follow-up with his usual providers.    Will call him with the results of these tests after they have been completed.  He knows that he should call if he has any change in his status, questions or concerns.      Leslee Saavedra MD

## 2024-02-14 NOTE — PATIENT INSTRUCTIONS
Reviewed labs and recent medical history.  Discussed the findings related to his high red blood cells. Discussed the possible causes of his high red blood cells. Will check other labs. Orders placed.  Discussed smoking cessation and that there are free tools available to assist. 1-800-I QUIT ([facility])  Discussed possible treatment options including phlebotomy. Will discuss further after lab results return.  Encouraged him to exercise and push fluids.  Does not need another appt at this time. Dr. Saavedra will call patient with results.

## 2024-02-14 NOTE — PROGRESS NOTES
Labs drawn at visit  Dr Saavedra will call pt with results- follow up TBD  Reviewed AVS with patient- patient verbalizes understanding

## 2024-02-16 LAB — EPO SERPL-ACNC: 6 MU/ML (ref 4–27)

## 2024-02-19 ENCOUNTER — HOSPITAL ENCOUNTER (OUTPATIENT)
Dept: RADIOLOGY | Facility: HOSPITAL | Age: 54
Discharge: HOME | End: 2024-02-19
Payer: COMMERCIAL

## 2024-02-19 DIAGNOSIS — F17.210 CIGARETTE SMOKER: ICD-10-CM

## 2024-02-19 LAB
ELECTRONICALLY SIGNED BY: NORMAL
JAK2 V617F INTERPRETATION: NORMAL
JAK2 V617F RESULT: NOT DETECTED

## 2024-02-19 PROCEDURE — 71271 CT THORAX LUNG CANCER SCR C-: CPT

## 2024-02-20 DIAGNOSIS — F17.200 SMOKER: ICD-10-CM

## 2024-02-20 DIAGNOSIS — R59.0 MEDIASTINAL LYMPHADENOPATHY: Primary | ICD-10-CM

## 2024-02-20 DIAGNOSIS — R91.1 LUNG NODULE: ICD-10-CM

## 2024-02-22 DIAGNOSIS — R91.1 LUNG NODULE: Primary | ICD-10-CM

## 2024-02-26 DIAGNOSIS — I10 PRIMARY HYPERTENSION: ICD-10-CM

## 2024-02-26 RX ORDER — LISINOPRIL 10 MG/1
10 TABLET ORAL DAILY
Qty: 90 TABLET | Refills: 3 | Status: SHIPPED | OUTPATIENT
Start: 2024-02-26

## 2024-03-07 ENCOUNTER — APPOINTMENT (OUTPATIENT)
Dept: URBAN - METROPOLITAN AREA CLINIC 204 | Age: 54
Setting detail: DERMATOLOGY
End: 2024-03-07

## 2024-03-07 PROCEDURE — OTHER MIPS QUALITY: OTHER

## 2024-03-07 PROCEDURE — 99214 OFFICE O/P EST MOD 30 MIN: CPT

## 2024-03-07 NOTE — PROCEDURE: MIPS QUALITY
Detail Level: Detailed
Quality 47: Advance Care Plan: Advance Care Planning discussed and documented; advance care plan or surrogate decision maker documented in the medical record.
Quality 130: Documentation Of Current Medications In The Medical Record: Current Medications Documented
Quality 226: Preventive Care And Screening: Tobacco Use: Screening And Cessation Intervention: Patient screened for tobacco use, is a smoker AND received Cessation Counseling within measurement period or in the six months prior to the measurement period
Additional Notes: Documentation for MIPS  purposes only. Full Patient visit note from paper chart is available for review and also scanned in EMA chart.

## 2024-04-23 DIAGNOSIS — R93.89 ABNORMAL CT OF THE CHEST: Primary | ICD-10-CM

## 2024-04-24 ENCOUNTER — TELEPHONE (OUTPATIENT)
Dept: PRIMARY CARE | Facility: CLINIC | Age: 54
End: 2024-04-24
Payer: COMMERCIAL

## 2024-04-24 NOTE — TELEPHONE ENCOUNTER
ORDER FOR F/U LOW DOSE CT IN   PATIENT JUST HAD A PET SCAN DONE AND RECOMMENDS A REPEAT CT WITH CONTRAST IN 6 MONTHS.    I JUST WANT TO VERIFY THESE ARE FOR TWO DIFFERENT REASONS? THAT THE PET WASN'T PART OF A PROBLEM WITH CHEST.    PLEASE LET ME KNOW IF WE WANT TO MOVE FORWARD WITH THE CT LOW DOSE OR WAIT 6 MONTHS FOR A CT W/ PER PET SCAN.    THANKS.

## 2024-07-31 ENCOUNTER — APPOINTMENT (OUTPATIENT)
Dept: URBAN - METROPOLITAN AREA CLINIC 204 | Age: 54
Setting detail: DERMATOLOGY
End: 2024-07-31

## 2024-07-31 PROCEDURE — OTHER MIPS QUALITY: OTHER

## 2024-07-31 PROCEDURE — 99213 OFFICE O/P EST LOW 20 MIN: CPT

## 2025-01-30 LAB
ALBUMIN SERPL-MCNC: 4.4 G/DL (ref 3.6–5.1)
ALP SERPL-CCNC: 86 U/L (ref 35–144)
ALT SERPL-CCNC: 29 U/L (ref 9–46)
ANION GAP SERPL CALCULATED.4IONS-SCNC: 8 MMOL/L (CALC) (ref 7–17)
AST SERPL-CCNC: 19 U/L (ref 10–35)
BASOPHILS # BLD AUTO: 94 CELLS/UL (ref 0–200)
BASOPHILS NFR BLD AUTO: 1 %
BILIRUB SERPL-MCNC: 0.5 MG/DL (ref 0.2–1.2)
BUN SERPL-MCNC: 11 MG/DL (ref 7–25)
CALCIUM SERPL-MCNC: 9.1 MG/DL (ref 8.6–10.3)
CHLORIDE SERPL-SCNC: 103 MMOL/L (ref 98–110)
CHOLEST SERPL-MCNC: 192 MG/DL
CHOLEST/HDLC SERPL: 7.1 (CALC)
CO2 SERPL-SCNC: 27 MMOL/L (ref 20–32)
CREAT SERPL-MCNC: 0.92 MG/DL (ref 0.7–1.3)
EGFRCR SERPLBLD CKD-EPI 2021: 99 ML/MIN/1.73M2
EOSINOPHIL # BLD AUTO: 367 CELLS/UL (ref 15–500)
EOSINOPHIL NFR BLD AUTO: 3.9 %
ERYTHROCYTE [DISTWIDTH] IN BLOOD BY AUTOMATED COUNT: 12.8 % (ref 11–15)
GLUCOSE SERPL-MCNC: 104 MG/DL (ref 65–99)
HCT VFR BLD AUTO: 54.8 % (ref 38.5–50)
HDLC SERPL-MCNC: 27 MG/DL
HGB BLD-MCNC: 18.5 G/DL (ref 13.2–17.1)
LDLC SERPL CALC-MCNC: 134 MG/DL (CALC)
LYMPHOCYTES # BLD AUTO: 4108 CELLS/UL (ref 850–3900)
LYMPHOCYTES NFR BLD AUTO: 43.7 %
MCH RBC QN AUTO: 31.9 PG (ref 27–33)
MCHC RBC AUTO-ENTMCNC: 33.8 G/DL (ref 32–36)
MCV RBC AUTO: 94.5 FL (ref 80–100)
MONOCYTES # BLD AUTO: 752 CELLS/UL (ref 200–950)
MONOCYTES NFR BLD AUTO: 8 %
NEUTROPHILS # BLD AUTO: 4080 CELLS/UL (ref 1500–7800)
NEUTROPHILS NFR BLD AUTO: 43.4 %
NONHDLC SERPL-MCNC: 165 MG/DL (CALC)
PLATELET # BLD AUTO: 291 THOUSAND/UL (ref 140–400)
PMV BLD REES-ECKER: 10.2 FL (ref 7.5–12.5)
POTASSIUM SERPL-SCNC: 4.1 MMOL/L (ref 3.5–5.3)
PROT SERPL-MCNC: 7.3 G/DL (ref 6.1–8.1)
PSA SERPL-MCNC: 0.69 NG/ML
RBC # BLD AUTO: 5.8 MILLION/UL (ref 4.2–5.8)
SODIUM SERPL-SCNC: 138 MMOL/L (ref 135–146)
TRIGL SERPL-MCNC: 178 MG/DL
WBC # BLD AUTO: 9.4 THOUSAND/UL (ref 3.8–10.8)

## 2025-02-10 ENCOUNTER — APPOINTMENT (OUTPATIENT)
Dept: PRIMARY CARE | Facility: CLINIC | Age: 55
End: 2025-02-10
Payer: COMMERCIAL

## 2025-02-10 VITALS
HEART RATE: 76 BPM | SYSTOLIC BLOOD PRESSURE: 140 MMHG | HEIGHT: 71 IN | BODY MASS INDEX: 31.92 KG/M2 | WEIGHT: 228 LBS | DIASTOLIC BLOOD PRESSURE: 82 MMHG

## 2025-02-10 DIAGNOSIS — K63.5 POLYP OF COLON, UNSPECIFIED PART OF COLON, UNSPECIFIED TYPE: ICD-10-CM

## 2025-02-10 DIAGNOSIS — F17.200 SMOKER: ICD-10-CM

## 2025-02-10 DIAGNOSIS — I10 PRIMARY HYPERTENSION: ICD-10-CM

## 2025-02-10 DIAGNOSIS — E78.1 HIGH TRIGLYCERIDES: ICD-10-CM

## 2025-02-10 DIAGNOSIS — R73.01 IFG (IMPAIRED FASTING GLUCOSE): Primary | ICD-10-CM

## 2025-02-10 DIAGNOSIS — D75.1 POLYCYTHEMIA: ICD-10-CM

## 2025-02-10 DIAGNOSIS — Z12.5 SPECIAL SCREENING FOR MALIGNANT NEOPLASM OF PROSTATE: ICD-10-CM

## 2025-02-10 DIAGNOSIS — N52.9 ERECTILE DYSFUNCTION, UNSPECIFIED ERECTILE DYSFUNCTION TYPE: ICD-10-CM

## 2025-02-10 PROBLEM — R10.9 ABDOMINAL PAIN: Status: RESOLVED | Noted: 2023-04-01 | Resolved: 2025-02-10

## 2025-02-10 PROCEDURE — 3008F BODY MASS INDEX DOCD: CPT | Performed by: INTERNAL MEDICINE

## 2025-02-10 PROCEDURE — 3079F DIAST BP 80-89 MM HG: CPT | Performed by: INTERNAL MEDICINE

## 2025-02-10 PROCEDURE — 4004F PT TOBACCO SCREEN RCVD TLK: CPT | Performed by: INTERNAL MEDICINE

## 2025-02-10 PROCEDURE — 3077F SYST BP >= 140 MM HG: CPT | Performed by: INTERNAL MEDICINE

## 2025-02-10 PROCEDURE — 99214 OFFICE O/P EST MOD 30 MIN: CPT | Performed by: INTERNAL MEDICINE

## 2025-02-10 RX ORDER — SILDENAFIL 50 MG/1
50 TABLET, FILM COATED ORAL AS NEEDED
Qty: 30 TABLET | Refills: 3 | Status: SHIPPED | OUTPATIENT
Start: 2025-02-10

## 2025-02-10 RX ORDER — LISINOPRIL 20 MG/1
20 TABLET ORAL DAILY
Qty: 90 TABLET | Refills: 3 | Status: SHIPPED | OUTPATIENT
Start: 2025-02-10 | End: 2026-02-10

## 2025-02-10 ASSESSMENT — ENCOUNTER SYMPTOMS
NAUSEA: 0
MUSCULOSKELETAL NEGATIVE: 1
COUGH: 0
DIARRHEA: 0
ABDOMINAL DISTENTION: 0
CONFUSION: 0
WHEEZING: 0
NEUROLOGICAL NEGATIVE: 1
CHILLS: 0
HEADACHES: 0
EYES NEGATIVE: 1
RESPIRATORY NEGATIVE: 1
BACK PAIN: 0
SHORTNESS OF BREATH: 0
ALLERGIC/IMMUNOLOGIC NEGATIVE: 1
ABDOMINAL PAIN: 0
JOINT SWELLING: 0
HEMATOLOGIC/LYMPHATIC NEGATIVE: 1
NECK STIFFNESS: 0
CARDIOVASCULAR NEGATIVE: 1
NUMBNESS: 0
AGITATION: 0
PSYCHIATRIC NEGATIVE: 1
LIGHT-HEADEDNESS: 0
FEVER: 0
VOMITING: 0
GASTROINTESTINAL NEGATIVE: 1
EYE DISCHARGE: 0
ENDOCRINE NEGATIVE: 1
DYSURIA: 0
PALPITATIONS: 0
CONSTIPATION: 0
ADENOPATHY: 0
CONSTITUTIONAL NEGATIVE: 1

## 2025-02-10 ASSESSMENT — PATIENT HEALTH QUESTIONNAIRE - PHQ9
2. FEELING DOWN, DEPRESSED OR HOPELESS: NOT AT ALL
1. LITTLE INTEREST OR PLEASURE IN DOING THINGS: NOT AT ALL
SUM OF ALL RESPONSES TO PHQ9 QUESTIONS 1 AND 2: 0

## 2025-02-10 NOTE — PROGRESS NOTES
Subjective   Patient ID: Martin Hewitt is a 54 y.o. male who presents for Follow-up (1 yr fu lab).  Fu with labs  Feels fine        Review of Systems   Constitutional: Negative.  Negative for chills and fever.   HENT: Negative.  Negative for congestion.    Eyes: Negative.  Negative for discharge.   Respiratory: Negative.  Negative for cough, shortness of breath and wheezing.    Cardiovascular: Negative.  Negative for chest pain, palpitations and leg swelling.   Gastrointestinal: Negative.  Negative for abdominal distention, abdominal pain, constipation, diarrhea, nausea and vomiting.   Endocrine: Negative.    Genitourinary: Negative.  Negative for dysuria and urgency.   Musculoskeletal: Negative.  Negative for back pain, joint swelling and neck stiffness.   Skin: Negative.  Negative for rash.   Allergic/Immunologic: Negative.  Negative for immunocompromised state.   Neurological: Negative.  Negative for light-headedness, numbness and headaches.   Hematological: Negative.  Negative for adenopathy.   Psychiatric/Behavioral: Negative.  Negative for agitation, behavioral problems and confusion.    All other systems reviewed and are negative.      Objective   Physical Exam  Vitals reviewed.   Constitutional:       General: He is not in acute distress.     Appearance: Normal appearance.   HENT:      Head: Normocephalic and atraumatic.      Nose: Nose normal.   Eyes:      Conjunctiva/sclera: Conjunctivae normal.      Pupils: Pupils are equal, round, and reactive to light.   Neck:      Vascular: No carotid bruit.   Cardiovascular:      Rate and Rhythm: Normal rate and regular rhythm.      Pulses: Normal pulses.      Heart sounds:      No gallop.   Pulmonary:      Effort: Pulmonary effort is normal. No respiratory distress.      Breath sounds: Normal breath sounds. No wheezing.   Abdominal:      General: Bowel sounds are normal.      Palpations: Abdomen is soft.      Tenderness: There is no abdominal tenderness.  "  Musculoskeletal:         General: Normal range of motion.      Cervical back: Normal range of motion. No rigidity.   Lymphadenopathy:      Cervical: No cervical adenopathy.   Skin:     General: Skin is warm.      Findings: No rash.   Neurological:      General: No focal deficit present.      Mental Status: He is alert and oriented to person, place, and time.   Psychiatric:         Mood and Affect: Mood normal.         Behavior: Behavior normal.       /82   Pulse 76   Ht 1.803 m (5' 11\")   Wt 103 kg (228 lb)   BMI 31.80 kg/m²    PSA, TOTAL   Date/Time Value Ref Range Status   01/29/2025 07:18 AM 0.69 < OR = 4.00 ng/mL Final     Comment:     The total PSA value from this assay system is   standardized against the WHO standard. The test   result will be approximately 20% lower when compared   to the equimolar-standardized total PSA (Dennis   Nazia). Comparison of serial PSA results should be   interpreted with this fact in mind.     This test was performed using the Siemens   chemiluminescent method. Values obtained from   different assay methods cannot be used  interchangeably. PSA levels, regardless of  value, should not be interpreted as absolute  evidence of the presence or absence of disease.       Assessment/Plan   Problem List Items Addressed This Visit       Erectile dysfunction    Relevant Medications    sildenafil (Viagra) 50 mg tablet    Other Relevant Orders    Comprehensive Metabolic Panel    High triglycerides    Relevant Orders    Lipid Panel    Hypertension    Relevant Medications    lisinopril 20 mg tablet    Other Relevant Orders    Comprehensive Metabolic Panel    Polycythemia    Relevant Orders    CBC and Auto Differential    Comprehensive Metabolic Panel    Smoker    Relevant Orders    Comprehensive Metabolic Panel    IFG (impaired fasting glucose) - Primary    Relevant Orders    Comprehensive Metabolic Panel    Hemoglobin A1C     Other Visit Diagnoses       Special screening for " malignant neoplasm of prostate        Relevant Orders    Prostate Specific Antigen, Screen    Polyp of colon, unspecified part of colon, unspecified type        Relevant Orders    Colonoscopy Screening; Average Risk Patient             Labs reviewed with pt      HTN addressed as follow:    MONITOR BP   GOAL BP LOWER THAN 130/80  LOW SALT  EXERCISE DAILY    Diabetes Mellitus/IFG addressed as follow:    1800 JESSY ADA  HGA1C GOAL LESS THAN 7  LOSE WT  EXERCISE DAILY      I have counselled patient about need for smoking/tobacco cessation and how I can support efforts when patient is ready to quit.  Discussed nicotine replacement therapy, Varenicline, Bupropion, hypnosis, support groups, and acupuncture as potential options.  I spend 4 minutes counseling.    Fu 1 year

## 2025-03-01 DIAGNOSIS — F41.9 ANXIETY: ICD-10-CM

## 2025-03-03 RX ORDER — PAROXETINE HYDROCHLORIDE 20 MG/1
20 TABLET, FILM COATED ORAL
Qty: 30 TABLET | Refills: 11 | Status: SHIPPED | OUTPATIENT
Start: 2025-03-03

## 2025-03-20 ENCOUNTER — APPOINTMENT (OUTPATIENT)
Dept: GASTROENTEROLOGY | Facility: CLINIC | Age: 55
End: 2025-03-20
Payer: COMMERCIAL

## 2025-03-20 ENCOUNTER — HOSPITAL ENCOUNTER (OUTPATIENT)
Dept: OPERATING ROOM | Facility: HOSPITAL | Age: 55
Setting detail: OUTPATIENT SURGERY
Discharge: HOME | End: 2025-03-20
Payer: COMMERCIAL

## 2025-03-20 ENCOUNTER — TELEPHONE (OUTPATIENT)
Dept: GASTROENTEROLOGY | Facility: CLINIC | Age: 55
End: 2025-03-20

## 2025-03-20 VITALS
TEMPERATURE: 99 F | BODY MASS INDEX: 31.5 KG/M2 | RESPIRATION RATE: 16 BRPM | WEIGHT: 225 LBS | HEART RATE: 62 BPM | DIASTOLIC BLOOD PRESSURE: 69 MMHG | OXYGEN SATURATION: 97 % | SYSTOLIC BLOOD PRESSURE: 111 MMHG | HEIGHT: 71 IN

## 2025-03-20 DIAGNOSIS — K63.5 POLYP OF COLON, UNSPECIFIED PART OF COLON, UNSPECIFIED TYPE: ICD-10-CM

## 2025-03-20 PROCEDURE — 3600000007 HC OR TIME - EACH INCREMENTAL 1 MINUTE - PROCEDURE LEVEL TWO: Performed by: INTERNAL MEDICINE

## 2025-03-20 PROCEDURE — 99152 MOD SED SAME PHYS/QHP 5/>YRS: CPT | Performed by: INTERNAL MEDICINE

## 2025-03-20 PROCEDURE — 7100000010 HC PHASE TWO TIME - EACH INCREMENTAL 1 MINUTE: Performed by: INTERNAL MEDICINE

## 2025-03-20 PROCEDURE — 7100000009 HC PHASE TWO TIME - INITIAL BASE CHARGE: Performed by: INTERNAL MEDICINE

## 2025-03-20 PROCEDURE — 3700000012 HC SEDATION LEVEL 5+ TIME - INITIAL 15 MINUTES 5/> YEARS: Performed by: INTERNAL MEDICINE

## 2025-03-20 PROCEDURE — 2500000004 HC RX 250 GENERAL PHARMACY W/ HCPCS (ALT 636 FOR OP/ED): Performed by: INTERNAL MEDICINE

## 2025-03-20 PROCEDURE — 45384 COLONOSCOPY W/LESION REMOVAL: CPT | Performed by: INTERNAL MEDICINE

## 2025-03-20 PROCEDURE — 3600000002 HC OR TIME - INITIAL BASE CHARGE - PROCEDURE LEVEL TWO: Performed by: INTERNAL MEDICINE

## 2025-03-20 RX ORDER — FENTANYL CITRATE 50 UG/ML
INJECTION, SOLUTION INTRAMUSCULAR; INTRAVENOUS AS NEEDED
Status: COMPLETED | OUTPATIENT
Start: 2025-03-20 | End: 2025-03-20

## 2025-03-20 RX ORDER — MIDAZOLAM HYDROCHLORIDE 5 MG/ML
INJECTION, SOLUTION INTRAMUSCULAR; INTRAVENOUS AS NEEDED
Status: COMPLETED | OUTPATIENT
Start: 2025-03-20 | End: 2025-03-20

## 2025-03-20 RX ADMIN — SODIUM CHLORIDE, SODIUM LACTATE, POTASSIUM CHLORIDE, AND CALCIUM CHLORIDE 500 ML: .6; .31; .03; .02 INJECTION, SOLUTION INTRAVENOUS at 10:45

## 2025-03-20 RX ADMIN — MIDAZOLAM HYDROCHLORIDE 2.5 MG: 5 INJECTION, SOLUTION INTRAMUSCULAR; INTRAVENOUS at 10:18

## 2025-03-20 RX ADMIN — MIDAZOLAM HYDROCHLORIDE 2.5 MG: 5 INJECTION, SOLUTION INTRAMUSCULAR; INTRAVENOUS at 10:17

## 2025-03-20 RX ADMIN — FENTANYL CITRATE 25 MCG: 50 INJECTION, SOLUTION INTRAMUSCULAR; INTRAVENOUS at 10:17

## 2025-03-20 ASSESSMENT — PAIN SCALES - GENERAL
PAINLEVEL_OUTOF10: 0 - NO PAIN

## 2025-03-20 ASSESSMENT — COLUMBIA-SUICIDE SEVERITY RATING SCALE - C-SSRS
1. IN THE PAST MONTH, HAVE YOU WISHED YOU WERE DEAD OR WISHED YOU COULD GO TO SLEEP AND NOT WAKE UP?: NO
6. HAVE YOU EVER DONE ANYTHING, STARTED TO DO ANYTHING, OR PREPARED TO DO ANYTHING TO END YOUR LIFE?: NO
2. HAVE YOU ACTUALLY HAD ANY THOUGHTS OF KILLING YOURSELF?: NO

## 2025-03-20 ASSESSMENT — PAIN - FUNCTIONAL ASSESSMENT
PAIN_FUNCTIONAL_ASSESSMENT: 0-10

## 2025-03-20 NOTE — H&P
Pre procedure H&P  Pt feels fine , no complaint  General appearance: Comfortable, no distress  ROS: No SOB  Medications reviewed  Head: Normal  Neck: Soft  Heart: Regular  Lungs: Clear  Abdomen: soft    Impression: clinically doing fine, proceed with procedure    Problem List Items Addressed This Visit    None  Visit Diagnoses       Polyp of colon, unspecified part of colon, unspecified type        Relevant Orders    Colonoscopy Screening; Average Risk Patient

## 2025-03-20 NOTE — DISCHARGE INSTRUCTIONS
Patient Instructions after a Colonoscopy      The anesthetics, sedatives or narcotics which were given to you today will be acting in your body for the next 24 hours, so you might feel a little sleepy or groggy.  This feeling should slowly wear off. Carefully read and follow the instructions.     You received sedation today:  - Do not drive or operate any machinery or power tools of any kind.   - No alcoholic beverages today, not even beer or wine.  - Do not make any important decisions or sign any legal documents.  - No over the counter medications that contain alcohol or that may cause drowsiness.  - Do not make any important decisions or sign any legal documents.  - Make sure you have someone with you for first 24 hours.    While it is common to experience mild to moderate abdominal distention, gas, or belching after your procedure, if any of these symptoms occur following discharge from the GI Lab or within one week of having your procedure, call the [facility] to be advised whether a visit to your nearest Urgent Care or Emergency Department is indicated.  Take this paper with you if you go.     - If you develop an allergic reaction to the medications that were given during your procedure such as difficulty breathing, rash, hives, severe nausea, vomiting or lightheadedness.  - If you experience chest pain, shortness of breath, severe abdominal pain, fevers and chills.  -If you develop signs and symptoms of bleeding such as blood in your spit, if your stools turn black, tarry, or bloody  - If you have not urinated within 8 hours following your procedure.  - If your IV site becomes painful, red, inflamed, or looks infected.    If you received a biopsy/polypectomy/sphincterotomy the following instructions apply below:    __ Do not use Aspirin containing products, non-steroidal medications or anti-coagulants for one week following your procedure. (Examples of these types of medications are: Advil,  Arthrotec, Aleve, Coumadin, Ecotrin, Heparin, Ibuprofen, Indocin, Motrin, Naprosyn, Nuprin, Plavix, Vioxx, and Voltarin, or their generic forms.  This list is not all-inclusive.  Check with your physician or pharmacist before resuming medications.)   __ Eat a soft diet today.  Avoid foods that are poorly digested for the next 24 hours.  These foods would include: nuts, beans, lettuce, red meats, and fried foods. Start with liquids and advance your diet as tolerated, gradually work up to eating solids.   __ Do not have a Barium Study or Enema for one week.    Your physician recommends the additional following instructions:    -You have a contact number available for emergencies. The signs and symptoms of potential delayed complications were discussed with you. You may return to normal activities tomorrow.  -Resume your previous diet.  -Continue your present medications.   -We are waiting for your pathology results.  -Your physician has recommended a repeat colonoscopy (date to be determined after pending pathology results are reviewed) for surveillance based on pathology results.  -The findings and recommendations have been discussed with you.  -The findings and recommendations were discussed with your family.  - Please see Medication Reconciliation Form for new medication/medications prescribed.       If you experience any problems or have any questions following discharge from the GI Lab, please call:        Nurse Signature                                                                        Date___________________                                                                            Patient/Responsible Party Signature                                        Date___________________

## 2025-03-31 LAB
LABORATORY COMMENT REPORT: NORMAL
PATH REPORT.FINAL DX SPEC: NORMAL
PATH REPORT.GROSS SPEC: NORMAL
PATH REPORT.RELEVANT HX SPEC: NORMAL
PATH REPORT.TOTAL CANCER: NORMAL

## 2025-09-04 DIAGNOSIS — N52.9 ERECTILE DYSFUNCTION, UNSPECIFIED ERECTILE DYSFUNCTION TYPE: ICD-10-CM

## 2025-09-04 DIAGNOSIS — F41.9 ANXIETY: ICD-10-CM

## 2025-09-04 DIAGNOSIS — I10 PRIMARY HYPERTENSION: ICD-10-CM

## 2025-09-04 RX ORDER — LISINOPRIL 20 MG/1
20 TABLET ORAL DAILY
Qty: 90 TABLET | Refills: 3 | Status: SHIPPED | OUTPATIENT
Start: 2025-09-04 | End: 2026-09-04

## 2025-09-04 RX ORDER — PAROXETINE 20 MG/1
20 TABLET, FILM COATED ORAL
Qty: 30 TABLET | Refills: 11 | Status: SHIPPED | OUTPATIENT
Start: 2025-09-04

## 2025-09-04 RX ORDER — SILDENAFIL 50 MG/1
50 TABLET, FILM COATED ORAL AS NEEDED
Qty: 30 TABLET | Refills: 3 | Status: SHIPPED | OUTPATIENT
Start: 2025-09-04

## 2026-02-16 ENCOUNTER — APPOINTMENT (OUTPATIENT)
Dept: PRIMARY CARE | Facility: CLINIC | Age: 56
End: 2026-02-16
Payer: COMMERCIAL